# Patient Record
(demographics unavailable — no encounter records)

---

## 2025-02-12 NOTE — HISTORY OF PRESENT ILLNESS
[de-identified] : SISI ZAVALA is a pleasant 65 year female. She presents with her son who provides translation at her request. SISI ZAVALA complains of bilateral knee, R>L, pain over the past 20+ years. Pain is located in the global region of the knee does not radiate. She denies prior injury or trauma. She notes the pain is worse with activity- walking/taking stairs and notes it's at its worst. She does have rest/nighttime pain with very painful range of motion. She has swelling in the right knee. She has been treated non-surgically with ice physical therapy (many times), anti-inflammatory medications, activity modification, ambulatory assistive devices: cane, and several intra-articular injections (both steroid and HA, last steroid was 1 year ago) which have stopped helping. The bilateral knee pain significantly interferes with their ADLs and quality of life. Denies any fevers and chills.

## 2025-02-12 NOTE — PHYSICAL EXAM
[de-identified] : General: Patient is a well-appearing female in no apparent distress. She is alert and oriented x 3. Vital signs are within normal limits.  No sign of fevers or infectious symptoms.   Hygiene: Excellent HEENT: Atraumatic with no asymmetry.  Neck motion is normal. No overt hearing deficits. Pulmonary: Breathing comfortably. Gastrointestinal: Is obese.   Psych: Responding appropriately with appropriate affect. Patient does not demonstrate tangential thought, perseveration or anxiety. Vascular: No rashes or obvious skin abnormalities in either lower extremity. Capillary refill is <2sec. Good distal perfusion. Neurovascular: Varicose veins absent. 5/5 strength with hip flexion, knee extension, ankle dorsiflexion, ankle plantar flexion, and EHL. Sensation is intact over the lower extremity in L2-S1 nerve distributions. 2+ dorsalis pedis and posterior tibial pulses   [de-identified] : Gait: She walks with a short stride and shuffling gait with obvious varus deformities   Inspection: Knee appears with some swelling on the right compared to the left. No ulcerations observed   Palpation Tenderness to palpation about the right knee   Range of Motion:   Right: 5-90, painful ROM    Left: 0-115   Bilateral knee stable to varus/valgus and ant/post stress   Both hips are stable no sign of dislocation or subluxation on exam with good pain free ROM.      [de-identified] : 4 views of bilateral knees are reviewed.  She has bilateral severe arthritis slightly more advanced on the right.  There is bone-on-bone apposition of both medial compartments with osteophyte formation.  There is subluxation of the femur on the tibia.

## 2025-02-12 NOTE — REVIEW OF SYSTEMS
[Arthralgia] : arthralgia [Joint Pain] : joint pain [Joint Stiffness] : joint stiffness [Joint Swelling] : joint swelling [Negative] : Heme/Lymph [Fever] : no fever [Chills] : no chills

## 2025-02-12 NOTE — DISCUSSION/SUMMARY
[de-identified] : Severe bilateral knee arthritis.  I had a long discussion with the patient regarding knee arthritis / degenerative disease and treatment options. We reviewed the nature and etiology of degenerative knee degenerative disease.  We discussed the spectrum of symptomatic treatments. Our discussion included the use of appropriate exercises, activity modifications, weight reduction and maintenance, appropriate medication use, use of assistive devices, role of injections and avoidance of high impact activities.   Given the clinical symptoms, physical exam and imaging findings, we discussed the possibility of knee replacement surgery.  We reviewed the elective quality of life nature of the procedure and the details of the surgery, approach and the implants used, using the model  in the clinic. This included discussion of the material and fixation options. We also discussed the risks, benefits and expected and potential outcomes at length.  The details of those risks are below.  Category 1 includes risks that could occur in association with any operation. They include heart attack, stroke, blood clot and pulmonary embolism, wound infection, transfusion reaction, drug allergy, and complications related to anesthesia. This list is not intended to be complete but only to convey a broad range of general medical risks to be aware of.  Blood clots may lead to a block in circulation. A blood clot that completely blocks a large artery can lead to gangrene. If this happens an amputation may be required. Blood clots in leg veins lead to pain and swelling. If part of the clot breaks off it can travel to the brain and lead to a stroke. A clot can also travel to the lung, resulting in a pulmonary embolism. Medication after surgery will minimize but not eliminate these complications.  Category 2 is a list of risks and complications specifically related to total or partial joint replacement. This list is not complete but is intended to make the patient aware of the kinds of implant-related risks and complications that might occur.    1. If the device gets loose or wears out further surgery may be required to revise the prosthesis. 2. If an infection develops, further surgery to washout the joint, remove or replace parts, or insert an antibiotic spacer may be required 3. Muscle, Tendon, Nerve and blood vessel damage may result as a consequence of mobilization of the joint or dissection near these structures. These injuries can lead to weakness, numbness and paralysis. The damage may be temporary or permanent. The recovery process can be long and may require further procedures.   4. Dislocations and instability may also require further surgery.   5. Periprosthetic fracture requiring revision surgery. 6. Leg length discrepancy  7. Stiffness 8. Wound complications requiring either local wound care, revision surgery, or plastic surgery consultation  9. Chronic pain requiring pain management   Patient feels strongly she like to proceed.  I think this is reasonable given her pain and its impact on her quality of life and activities of daily living and lack of durable response to treatments I recommend a staged approach and we will begin with the right knee which is more symptomatic.  She will have support from her son and may stay with them in the initial period after surgery.  We would plan a home discharge.  Will use cemented implants will likely resurface the patella.  She expressed understanding agreement this.  All questions were answered of the patient and her patient and her son who did provide translation at the patient's request.  Plan: Right total knee arthroplasty   Equipment: [employer]  Evauations: PCP.  I, Dr. Elias, personally performed the evaluation and management (E/M) services for this patient. That E/M includes conducting the clinically appropriate initial history &/or exam, assessing all conditions, and establishing the plan of care. Today, my TARYN, Melinda Joya, was here to observe my evaluation and management service for this patient & follow plan of care established by me going forward.

## 2025-02-27 NOTE — PHYSICAL EXAM
[No Acute Distress] : no acute distress [Normal Sclera/Conjunctiva] : normal sclera/conjunctiva [PERRL] : pupils equal round and reactive to light [EOMI] : extraocular movements intact [Normal Outer Ear/Nose] : the outer ears and nose were normal in appearance [Normal Oropharynx] : the oropharynx was normal [No JVD] : no jugular venous distention [No Lymphadenopathy] : no lymphadenopathy [Supple] : supple [Thyroid Normal, No Nodules] : the thyroid was normal and there were no nodules present [No Respiratory Distress] : no respiratory distress  [No Accessory Muscle Use] : no accessory muscle use [Clear to Auscultation] : lungs were clear to auscultation bilaterally [Normal Rate] : normal rate  [Regular Rhythm] : with a regular rhythm [Normal S1, S2] : normal S1 and S2 [No Murmur] : no murmur heard [No Carotid Bruits] : no carotid bruits [No Abdominal Bruit] : a ~M bruit was not heard ~T in the abdomen [No Varicosities] : no varicosities [Pedal Pulses Present] : the pedal pulses are present [No Edema] : there was no peripheral edema [No Palpable Aorta] : no palpable aorta [No Extremity Clubbing/Cyanosis] : no extremity clubbing/cyanosis [Soft] : abdomen soft [Non Tender] : non-tender [Non-distended] : non-distended [No Masses] : no abdominal mass palpated [No HSM] : no HSM [Normal Bowel Sounds] : normal bowel sounds [Normal Posterior Cervical Nodes] : no posterior cervical lymphadenopathy [Normal Anterior Cervical Nodes] : no anterior cervical lymphadenopathy [No CVA Tenderness] : no CVA  tenderness [No Spinal Tenderness] : no spinal tenderness [Grossly Normal Strength/Tone] : grossly normal strength/tone [No Rash] : no rash [Coordination Grossly Intact] : coordination grossly intact [No Focal Deficits] : no focal deficits [Normal Gait] : normal gait [Deep Tendon Reflexes (DTR)] : deep tendon reflexes were 2+ and symmetric [Normal Affect] : the affect was normal [Normal Insight/Judgement] : insight and judgment were intact

## 2025-03-03 NOTE — ASSESSMENT
[Patient Optimized for Surgery] : Patient optimized for surgery [No Further Testing Recommended] : no further testing recommended [FreeTextEntry4] : Estonian  : son - Aram Menchaca  65 yrs old F with pmx of HTN, HLD, pre DM comes in for pre-op eval for RIGHT TOTAL KNEE ARTHROPLASTY RCRI 0 points class I risk. Moderate functional status Pt is low risk for low/intermediate risk procedure. Asked to stop aspirin 81 mg OD 7 days before the surgery.  No interventions needed from medicine at this point of time.

## 2025-03-03 NOTE — HISTORY OF PRESENT ILLNESS
[No Pertinent Pulmonary History] : no history of asthma, COPD, sleep apnea, or smoking [(Patient denies any chest pain, claudication, dyspnea on exertion, orthopnea, palpitations or syncope)] : Patient denies any chest pain, claudication, dyspnea on exertion, orthopnea, palpitations or syncope [Moderate (4-6 METs)] : Moderate (4-6 METs) [No Pertinent Cardiac History] : no history of aortic stenosis, atrial fibrillation, coronary artery disease, recent myocardial infarction, or implantable device/pacemaker [FreeTextEntry1] : RIGHT TOTAL KNEE ARTHROPLASTY [FreeTextEntry2] : 03/25/2025 [FreeTextEntry4] : Occitan  : son - Aram Menchaca  65 yrs old F with pmx of HTN, HLD, pre DM comes in for pre-op eval for RIGHT TOTAL KNEE ARTHROPLASTY No new complains.  Denies any chest pain, cough, fevers, abd pain, vomiting, diarrhea, rash, sob, palpitations. Moderate functional capacity: can walk only 1-2 blocks these days, limited due to knee pain No history with anesthesia use in the past. use aspirin 81 mg OD but no herbal meds use.

## 2025-03-03 NOTE — ASSESSMENT
[Patient Optimized for Surgery] : Patient optimized for surgery [No Further Testing Recommended] : no further testing recommended [FreeTextEntry4] : Japanese  : son - Aram Menchaca  65 yrs old F with pmx of HTN, HLD, pre DM comes in for pre-op eval for RIGHT TOTAL KNEE ARTHROPLASTY RCRI 0 points class I risk. Moderate functional status Pt is low risk for low/intermediate risk procedure. Asked to stop aspirin 81 mg OD 7 days before the surgery.  No interventions needed from medicine at this point of time.

## 2025-03-03 NOTE — HISTORY OF PRESENT ILLNESS
[No Pertinent Pulmonary History] : no history of asthma, COPD, sleep apnea, or smoking [(Patient denies any chest pain, claudication, dyspnea on exertion, orthopnea, palpitations or syncope)] : Patient denies any chest pain, claudication, dyspnea on exertion, orthopnea, palpitations or syncope [Moderate (4-6 METs)] : Moderate (4-6 METs) [No Pertinent Cardiac History] : no history of aortic stenosis, atrial fibrillation, coronary artery disease, recent myocardial infarction, or implantable device/pacemaker [FreeTextEntry1] : RIGHT TOTAL KNEE ARTHROPLASTY [FreeTextEntry2] : 03/25/2025 [FreeTextEntry4] : Uzbek  : son - Aram Menchaca  65 yrs old F with pmx of HTN, HLD, pre DM comes in for pre-op eval for RIGHT TOTAL KNEE ARTHROPLASTY No new complains.  Denies any chest pain, cough, fevers, abd pain, vomiting, diarrhea, rash, sob, palpitations. Moderate functional capacity: can walk only 1-2 blocks these days, limited due to knee pain No history with anesthesia use in the past. use aspirin 81 mg OD but no herbal meds use.

## 2025-04-09 NOTE — HISTORY OF PRESENT ILLNESS
[de-identified] : Patient is a pleasant 65-year-old woman who is here today with her son who provides translation at her request.  She is 3 weeks status post right total knee arthroplasty.  She denies fevers or chills or chest pain or shortness of breath.  She is taking her aspirin as instructed.  She can try to minimize pain and use it and her pain control has not been optimal.  She is doing home PT.

## 2025-04-09 NOTE — PROCEDURE
[Injection] : Injection [Left] : of the left [Effusion] : Effusion [Osteoarthritis] : Osteoarthritis [Patient] : patient [Risk] : risk [Ethyl Chloride Spray] : ethyl chloride spray was used as a topical anesthetic [Lateral] : lateral [0.25% Bupivacaine___(mL)] : [unfilled] mL of 0.25% Bupivacaine [Triamcinolone 10mg/mL___(mL)] : [unfilled] ~UmL of 10mg/mL triamcinolone [Tolerated Well] : The patient tolerated the procedure well [None] : none

## 2025-04-09 NOTE — DISCUSSION/SUMMARY
[de-identified] : 3 weeks S/P total knee replacement.  Symptomatic and pain relief, range of motion, activity advancement and precaution strategies reviewed, including use of over-the-counter medications as needed.  Importance of adequate pain control to allow for adequate participation in physical therapy as well as advancement of activities was discussed and I did provide appropriate refills.  I also provided an outpatient physical therapy prescription.  He is complaining of left knee and requests an injection and there is known arthritis there and we performed an injection into obtaining verbal consent.  I performed the procedure and was tolerated well.  We provided information regarding the need for antibiotic prophylaxis for future dental or invasive procedures.  We discussed appropriate travel restrictions for foreign travel as well.  We will follow up as scheduled in 4 to 6 weeks for another check of her range of motion, but advised them to return sooner if they develops any concerns for an evaluation.  Patient and her son who again was present and provided translation were agreement this plan.  All questions were answered

## 2025-04-09 NOTE — PHYSICAL EXAM
[de-identified] : On physical exam she is alert and oriented.  Incision is clean dry and intact no significant erythema.  Swelling is within normal limits.  Range of motion is from 3 to 90 degrees.  There is no instability to the knee.  She is neurologically intact and has negative Homans. [de-identified] : 3 views of the right knee are ordered to evaluate her arthroplasty and show well aligned knee arthroplasty with no hardware complication loosening fracture or dislocation.

## 2025-04-30 NOTE — HISTORY OF PRESENT ILLNESS
[de-identified] : Patient is a pleasant 65-year-old woman who is here for follow-up evaluation with her son.  She is 5 weeks status post right total knee arthroplasty.  He is providing translation at her request.  Things are improving.  She continues to work with therapy.  She is taking her Celebrex as well as acetaminophen and occasional tramadol.  She has been icing the knee.  She uses a cane for distances.  She still feels stiffness in the knee as well as some anterior discomfort.  She is having left knee discomfort medially as well. [de-identified] : Exam she is alert and oriented.  Incisions well-healed but there is a few small areas of pinpoint eschar.  Range of motion is improved from 0-90 or 95 degrees.  There is no instability today.  There is swelling but is within normal limits.  There is a negative Homans and she is neurologically intact. [de-identified] : No new x-rays today but previous x-rays show well aligned arthroplasty with no hardware complication loosening fracture or dislocation. [de-identified] : 5 weeks S/P right total knee replacement, progressing well.  Range of motion is improved but I like to continue working on this.  Preoperatively she was very stiff but 5-90 so not sure what endpoint will achieve.  Symptomatic and pain relief, range of motion, activity advancement and precaution strategies reviewed, including use of over-the-counter medications as needed.  We provided information regarding the need for antibiotic prophylaxis for future dental or invasive procedures. We will follow up as scheduled in 4 weeks, but advised them to return sooner if they develops any concerns for an evaluation.  Patient is in agreement this plan.  All questions the patient and her son who was present and providing translation as noted answered.